# Patient Record
Sex: FEMALE
[De-identification: names, ages, dates, MRNs, and addresses within clinical notes are randomized per-mention and may not be internally consistent; named-entity substitution may affect disease eponyms.]

---

## 2019-11-20 ENCOUNTER — HOSPITAL ENCOUNTER (OUTPATIENT)
Dept: HOSPITAL 90 - EDH | Age: 84
Setting detail: OBSERVATION
LOS: 3 days | Discharge: HOME | End: 2019-11-23
Attending: INTERNAL MEDICINE | Admitting: INTERNAL MEDICINE
Payer: COMMERCIAL

## 2019-11-20 VITALS — BODY MASS INDEX: 37.67 KG/M2 | WEIGHT: 204.7 LBS | HEIGHT: 62 IN

## 2019-11-20 DIAGNOSIS — E66.01: ICD-10-CM

## 2019-11-20 DIAGNOSIS — I48.92: ICD-10-CM

## 2019-11-20 DIAGNOSIS — Z79.899: ICD-10-CM

## 2019-11-20 DIAGNOSIS — R31.9: ICD-10-CM

## 2019-11-20 DIAGNOSIS — E11.22: ICD-10-CM

## 2019-11-20 DIAGNOSIS — I13.0: ICD-10-CM

## 2019-11-20 DIAGNOSIS — Z90.49: ICD-10-CM

## 2019-11-20 DIAGNOSIS — D64.9: ICD-10-CM

## 2019-11-20 DIAGNOSIS — E78.00: ICD-10-CM

## 2019-11-20 DIAGNOSIS — N18.9: ICD-10-CM

## 2019-11-20 DIAGNOSIS — J98.11: ICD-10-CM

## 2019-11-20 DIAGNOSIS — I07.1: ICD-10-CM

## 2019-11-20 DIAGNOSIS — E78.5: ICD-10-CM

## 2019-11-20 DIAGNOSIS — R80.9: ICD-10-CM

## 2019-11-20 DIAGNOSIS — M19.90: ICD-10-CM

## 2019-11-20 DIAGNOSIS — I50.32: ICD-10-CM

## 2019-11-20 DIAGNOSIS — Z90.710: ICD-10-CM

## 2019-11-20 DIAGNOSIS — I48.91: ICD-10-CM

## 2019-11-20 DIAGNOSIS — J10.00: Primary | ICD-10-CM

## 2019-11-20 LAB
ALBUMIN SERPL-MCNC: 3.2 G/DL (ref 3.5–5)
ALT SERPL-CCNC: 20 U/L (ref 12–78)
APTT PPP: 26.8 SEC (ref 26.3–35.5)
AST SERPL-CCNC: 18 U/L (ref 10–37)
BASOPHILS NFR BLD AUTO: 0.6 % (ref 0–5)
BILIRUB SERPL-MCNC: 1.2 MG/DL (ref 0.2–1)
BNP SERPL-MCNC: 755 PG/ML (ref 0–100)
BUN SERPL-MCNC: 10 MG/DL (ref 7–18)
CHLORIDE SERPL-SCNC: 98 MMOL/L (ref 101–111)
CK SERPL-CCNC: 124 U/L (ref 21–232)
CO2 SERPL-SCNC: 26 MMOL/L (ref 21–32)
CREAT SERPL-MCNC: 1 MG/DL (ref 0.5–1.5)
EOSINOPHIL NFR BLD AUTO: 0.3 % (ref 0–8)
ERYTHROCYTE [DISTWIDTH] IN BLOOD BY AUTOMATED COUNT: 14.2 % (ref 11–15.5)
GFR SERPL CREATININE-BSD FRML MDRD: 55 ML/MIN (ref 60–?)
GLUCOSE SERPL-MCNC: 154 MG/DL (ref 70–105)
HCT VFR BLD AUTO: 40.9 % (ref 36–48)
INR PPP: 1.06 (ref 0.85–1.15)
KETONES UR STRIP-MCNC: 15 MG/DL
LYMPHOCYTES NFR SPEC AUTO: 10.1 % (ref 21–51)
MCH RBC QN AUTO: 32.7 PG (ref 27–33)
MCHC RBC AUTO-ENTMCNC: 34.7 G/DL (ref 32–36)
MCV RBC AUTO: 94.3 FL (ref 79–99)
MONOCYTES NFR BLD AUTO: 15.1 % (ref 3–13)
MYOGLOBIN SERPL-MCNC: 137 NG/ML (ref 10–92)
NEUTROPHILS NFR BLD AUTO: 73.9 % (ref 40–77)
NRBC BLD MANUAL-RTO: 0 % (ref 0–0.19)
PH UR STRIP: 5.5 [PH] (ref 5–8)
PLATELET # BLD AUTO: 184 K/UL (ref 130–400)
POTASSIUM SERPL-SCNC: 3.5 MMOL/L (ref 3.5–5.1)
PROT SERPL-MCNC: 7.7 G/DL (ref 6–8.3)
PROT UR QL STRIP: 300 MG/DL
PROTHROMBIN TIME: 10.9 SEC (ref 9.6–11.6)
RBC # BLD AUTO: 4.33 MIL/UL (ref 4–5.5)
RBC #/AREA URNS HPF: (no result) /HPF (ref 0–1)
SODIUM SERPL-SCNC: 135 MMOL/L (ref 136–145)
SP GR UR STRIP: 1.02 (ref 1–1.03)
TROPONIN I SERPL-MCNC: < 0.04 NG/ML (ref 0–0.06)
UROBILINOGEN UR STRIP-MCNC: 1 MG/DL (ref 0.2–1)
WBC # BLD AUTO: 11.4 K/UL (ref 4.8–10.8)
WBC #/AREA URNS HPF: (no result) /HPF (ref 0–1)

## 2019-11-20 PROCEDURE — 85730 THROMBOPLASTIN TIME PARTIAL: CPT

## 2019-11-20 PROCEDURE — 83605 ASSAY OF LACTIC ACID: CPT

## 2019-11-20 PROCEDURE — 93005 ELECTROCARDIOGRAM TRACING: CPT

## 2019-11-20 PROCEDURE — 71046 X-RAY EXAM CHEST 2 VIEWS: CPT

## 2019-11-20 PROCEDURE — 76770 US EXAM ABDO BACK WALL COMP: CPT

## 2019-11-20 PROCEDURE — 87804 INFLUENZA ASSAY W/OPTIC: CPT

## 2019-11-20 PROCEDURE — 96375 TX/PRO/DX INJ NEW DRUG ADDON: CPT

## 2019-11-20 PROCEDURE — 96365 THER/PROPH/DIAG IV INF INIT: CPT

## 2019-11-20 PROCEDURE — 85610 PROTHROMBIN TIME: CPT

## 2019-11-20 PROCEDURE — 81001 URINALYSIS AUTO W/SCOPE: CPT

## 2019-11-20 PROCEDURE — 87088 URINE BACTERIA CULTURE: CPT

## 2019-11-20 PROCEDURE — 85027 COMPLETE CBC AUTOMATED: CPT

## 2019-11-20 PROCEDURE — 94640 AIRWAY INHALATION TREATMENT: CPT

## 2019-11-20 PROCEDURE — 96376 TX/PRO/DX INJ SAME DRUG ADON: CPT

## 2019-11-20 PROCEDURE — 36415 COLL VENOUS BLD VENIPUNCTURE: CPT

## 2019-11-20 PROCEDURE — 82550 ASSAY OF CK (CPK): CPT

## 2019-11-20 PROCEDURE — 71045 X-RAY EXAM CHEST 1 VIEW: CPT

## 2019-11-20 PROCEDURE — 80053 COMPREHEN METABOLIC PANEL: CPT

## 2019-11-20 PROCEDURE — 83874 ASSAY OF MYOGLOBIN: CPT

## 2019-11-20 PROCEDURE — 84145 PROCALCITONIN (PCT): CPT

## 2019-11-20 PROCEDURE — 83880 ASSAY OF NATRIURETIC PEPTIDE: CPT

## 2019-11-20 PROCEDURE — 84484 ASSAY OF TROPONIN QUANT: CPT

## 2019-11-20 PROCEDURE — 80048 BASIC METABOLIC PNL TOTAL CA: CPT

## 2019-11-20 PROCEDURE — 85025 COMPLETE CBC W/AUTO DIFF WBC: CPT

## 2019-11-20 PROCEDURE — 99284 EMERGENCY DEPT VISIT MOD MDM: CPT

## 2019-11-20 PROCEDURE — 87040 BLOOD CULTURE FOR BACTERIA: CPT

## 2019-11-20 PROCEDURE — 96366 THER/PROPH/DIAG IV INF ADDON: CPT

## 2019-11-20 PROCEDURE — 94664 DEMO&/EVAL PT USE INHALER: CPT

## 2019-11-20 RX ADMIN — OSELTAMIVIR PHOSPHATE SCH MG: 75 CAPSULE ORAL at 23:45

## 2019-11-21 VITALS — DIASTOLIC BLOOD PRESSURE: 77 MMHG | SYSTOLIC BLOOD PRESSURE: 131 MMHG

## 2019-11-21 VITALS — SYSTOLIC BLOOD PRESSURE: 147 MMHG | DIASTOLIC BLOOD PRESSURE: 103 MMHG

## 2019-11-21 VITALS — DIASTOLIC BLOOD PRESSURE: 83 MMHG | SYSTOLIC BLOOD PRESSURE: 147 MMHG

## 2019-11-21 VITALS — SYSTOLIC BLOOD PRESSURE: 167 MMHG | DIASTOLIC BLOOD PRESSURE: 81 MMHG

## 2019-11-21 VITALS — DIASTOLIC BLOOD PRESSURE: 80 MMHG | SYSTOLIC BLOOD PRESSURE: 139 MMHG

## 2019-11-21 VITALS — DIASTOLIC BLOOD PRESSURE: 77 MMHG | SYSTOLIC BLOOD PRESSURE: 127 MMHG

## 2019-11-21 VITALS — DIASTOLIC BLOOD PRESSURE: 74 MMHG | SYSTOLIC BLOOD PRESSURE: 146 MMHG

## 2019-11-21 LAB
BUN SERPL-MCNC: 11 MG/DL (ref 7–18)
CHLORIDE SERPL-SCNC: 100 MMOL/L (ref 101–111)
CO2 SERPL-SCNC: 24 MMOL/L (ref 21–32)
CREAT SERPL-MCNC: 1 MG/DL (ref 0.5–1.5)
ERYTHROCYTE [DISTWIDTH] IN BLOOD BY AUTOMATED COUNT: 13.9 % (ref 11–15.5)
GFR SERPL CREATININE-BSD FRML MDRD: 55 ML/MIN (ref 60–?)
GLUCOSE SERPL-MCNC: 189 MG/DL (ref 70–105)
HCT VFR BLD AUTO: 38.5 % (ref 36–48)
MCH RBC QN AUTO: 32.6 PG (ref 27–33)
MCHC RBC AUTO-ENTMCNC: 34.7 G/DL (ref 32–36)
MCV RBC AUTO: 94 FL (ref 79–99)
NRBC BLD MANUAL-RTO: 0 % (ref 0–0.19)
PLATELET # BLD AUTO: 186 K/UL (ref 130–400)
POTASSIUM SERPL-SCNC: 4 MMOL/L (ref 3.5–5.1)
RBC # BLD AUTO: 4.1 MIL/UL (ref 4–5.5)
SODIUM SERPL-SCNC: 135 MMOL/L (ref 136–145)
WBC # BLD AUTO: 10.9 K/UL (ref 4.8–10.8)

## 2019-11-21 RX ADMIN — AZITHROMYCIN MONOHYDRATE SCH MLS/HR: 500 INJECTION, POWDER, LYOPHILIZED, FOR SOLUTION INTRAVENOUS at 00:28

## 2019-11-21 RX ADMIN — WATER SCH ML: 1 INJECTION INTRAMUSCULAR; INTRAVENOUS; SUBCUTANEOUS at 00:00

## 2019-11-21 RX ADMIN — OSELTAMIVIR PHOSPHATE SCH MG: 75 CAPSULE ORAL at 09:38

## 2019-11-21 RX ADMIN — SODIUM CHLORIDE SCH GM: 9 INJECTION, SOLUTION INTRAVENOUS at 00:00

## 2019-11-21 RX ADMIN — IPRATROPIUM BROMIDE AND ALBUTEROL SULFATE PRN UDVIAL: .5; 3 SOLUTION RESPIRATORY (INHALATION) at 06:46

## 2019-11-21 RX ADMIN — IPRATROPIUM BROMIDE AND ALBUTEROL SULFATE PRN UDVIAL: .5; 3 SOLUTION RESPIRATORY (INHALATION) at 01:17

## 2019-11-21 RX ADMIN — OSELTAMIVIR PHOSPHATE SCH MG: 75 CAPSULE ORAL at 20:29

## 2019-11-21 NOTE — NUR
DCP

CM met with pt discussed dc plans. Pt is semi-independent prior to admission, lives at home 
with daughter and son in law. Pt has a wheelchair, walker, shower chair. Denies any other 
equipments/services. Feels safe to go back home, daughter and son in law able to assist with 
transportation and needs as necessary. Offered possible short term placement rehab, pt 
undecided for now, pt and family would like to wait for MD recommendations prior to 
deciding. DC plan to home vs SNF. CM to cont to follow up.

-------------------------------------------------------------------------------

Addendum: 11/21/19 at 1452 by KALYAN VIGIL LVN CM

-------------------------------------------------------------------------------

Amended: Links added.

## 2019-11-21 NOTE — NUR
blind in right eye

-------------------------------------------------------------------------------

Addendum: 11/21/19 at 1201 by MAURICIO VARELA RN RN

-------------------------------------------------------------------------------

Amended: Links added.

## 2019-11-21 NOTE — NUR
CM Note: declined placement

CM met with pt and daughter. Discussed MD recommendations for possible SNF vs home w/HH. Pt 
verbalized she wants to go home, daughter state will follow up w/pcp for home w/HH for 
possible PT at home instead. Declined placement at this time. Primary nurse aware. CM to 
cont to follow up.

## 2019-11-21 NOTE — NUR
CHIEF COMPLAINT COUGH AND COLDS

PATIENT COMPLAINED OF CONTINUOUS COUGHING WITH SECRETIONS, ACCORDING TO PATIENT IT IS HARD 
FOR HER TO EXPECTORATE PHLEGM. DAUGHTER IS ASKING MEDICATIONS TO LOOSEN PHLEGM. SPOKE WITH 
RT FOR NEB TREATMENT. PAGED ON CALL FOR ELANA BOBBY REGARDING PATIENT'S COMPLAINT 
PENDING FOR CALL BACK

## 2019-11-22 VITALS — SYSTOLIC BLOOD PRESSURE: 151 MMHG | DIASTOLIC BLOOD PRESSURE: 76 MMHG

## 2019-11-22 VITALS — DIASTOLIC BLOOD PRESSURE: 91 MMHG | SYSTOLIC BLOOD PRESSURE: 143 MMHG

## 2019-11-22 VITALS — DIASTOLIC BLOOD PRESSURE: 76 MMHG | SYSTOLIC BLOOD PRESSURE: 144 MMHG

## 2019-11-22 VITALS — SYSTOLIC BLOOD PRESSURE: 138 MMHG | DIASTOLIC BLOOD PRESSURE: 74 MMHG

## 2019-11-22 VITALS — SYSTOLIC BLOOD PRESSURE: 133 MMHG | DIASTOLIC BLOOD PRESSURE: 68 MMHG

## 2019-11-22 VITALS — SYSTOLIC BLOOD PRESSURE: 146 MMHG | DIASTOLIC BLOOD PRESSURE: 77 MMHG

## 2019-11-22 LAB
BASOPHILS NFR BLD AUTO: 0.1 % (ref 0–5)
BUN SERPL-MCNC: 13 MG/DL (ref 7–18)
BUN SERPL-MCNC: 15 MG/DL (ref 7–18)
CHLORIDE SERPL-SCNC: 103 MMOL/L (ref 101–111)
CHLORIDE SERPL-SCNC: 103 MMOL/L (ref 101–111)
CO2 SERPL-SCNC: 27 MMOL/L (ref 21–32)
CO2 SERPL-SCNC: 28 MMOL/L (ref 21–32)
CREAT SERPL-MCNC: 1 MG/DL (ref 0.5–1.5)
CREAT SERPL-MCNC: 1.1 MG/DL (ref 0.5–1.5)
EOSINOPHIL NFR BLD AUTO: 0 % (ref 0–8)
ERYTHROCYTE [DISTWIDTH] IN BLOOD BY AUTOMATED COUNT: 13.7 % (ref 11–15.5)
GFR SERPL CREATININE-BSD FRML MDRD: 50 ML/MIN (ref 60–?)
GFR SERPL CREATININE-BSD FRML MDRD: 55 ML/MIN (ref 60–?)
GLUCOSE SERPL-MCNC: 144 MG/DL (ref 70–105)
GLUCOSE SERPL-MCNC: 210 MG/DL (ref 70–105)
HCT VFR BLD AUTO: 39 % (ref 36–48)
LYMPHOCYTES NFR SPEC AUTO: 9.5 % (ref 21–51)
MCH RBC QN AUTO: 32.8 PG (ref 27–33)
MCHC RBC AUTO-ENTMCNC: 34.5 G/DL (ref 32–36)
MCV RBC AUTO: 95.2 FL (ref 79–99)
MONOCYTES NFR BLD AUTO: 11.9 % (ref 3–13)
NEUTROPHILS NFR BLD AUTO: 78.5 % (ref 40–77)
NRBC BLD MANUAL-RTO: 0 % (ref 0–0.19)
PLATELET # BLD AUTO: 208 K/UL (ref 130–400)
POTASSIUM SERPL-SCNC: 3.4 MMOL/L (ref 3.5–5.1)
POTASSIUM SERPL-SCNC: 3.8 MMOL/L (ref 3.5–5.1)
RBC # BLD AUTO: 4.1 MIL/UL (ref 4–5.5)
SODIUM SERPL-SCNC: 139 MMOL/L (ref 136–145)
SODIUM SERPL-SCNC: 140 MMOL/L (ref 136–145)
WBC # BLD AUTO: 13.9 K/UL (ref 4.8–10.8)

## 2019-11-22 RX ADMIN — OSELTAMIVIR PHOSPHATE SCH MG: 75 CAPSULE ORAL at 20:11

## 2019-11-22 RX ADMIN — IPRATROPIUM BROMIDE AND ALBUTEROL SULFATE PRN UDVIAL: .5; 3 SOLUTION RESPIRATORY (INHALATION) at 18:14

## 2019-11-22 RX ADMIN — AZITHROMYCIN MONOHYDRATE SCH MLS/HR: 500 INJECTION, POWDER, LYOPHILIZED, FOR SOLUTION INTRAVENOUS at 23:08

## 2019-11-22 RX ADMIN — WATER SCH ML: 1 INJECTION INTRAMUSCULAR; INTRAVENOUS; SUBCUTANEOUS at 23:08

## 2019-11-22 RX ADMIN — OSELTAMIVIR PHOSPHATE SCH MG: 75 CAPSULE ORAL at 09:22

## 2019-11-22 RX ADMIN — AZITHROMYCIN MONOHYDRATE SCH MLS/HR: 500 INJECTION, POWDER, LYOPHILIZED, FOR SOLUTION INTRAVENOUS at 03:26

## 2019-11-22 RX ADMIN — SODIUM CHLORIDE SCH GM: 9 INJECTION, SOLUTION INTRAVENOUS at 03:26

## 2019-11-22 RX ADMIN — WATER SCH ML: 1 INJECTION INTRAMUSCULAR; INTRAVENOUS; SUBCUTANEOUS at 00:00

## 2019-11-22 RX ADMIN — IPRATROPIUM BROMIDE AND ALBUTEROL SULFATE PRN UDVIAL: .5; 3 SOLUTION RESPIRATORY (INHALATION) at 06:35

## 2019-11-22 RX ADMIN — SODIUM CHLORIDE SCH GM: 9 INJECTION, SOLUTION INTRAVENOUS at 23:08

## 2019-11-23 VITALS — SYSTOLIC BLOOD PRESSURE: 137 MMHG | DIASTOLIC BLOOD PRESSURE: 82 MMHG

## 2019-11-23 VITALS — SYSTOLIC BLOOD PRESSURE: 148 MMHG | DIASTOLIC BLOOD PRESSURE: 86 MMHG

## 2019-11-23 VITALS — DIASTOLIC BLOOD PRESSURE: 61 MMHG | SYSTOLIC BLOOD PRESSURE: 113 MMHG

## 2019-11-23 LAB
ERYTHROCYTE [DISTWIDTH] IN BLOOD BY AUTOMATED COUNT: 14.7 % (ref 11–15.5)
HCT VFR BLD AUTO: 41.3 % (ref 36–48)
MCH RBC QN AUTO: 32.2 PG (ref 27–33)
MCHC RBC AUTO-ENTMCNC: 33.6 G/DL (ref 32–36)
MCV RBC AUTO: 95.8 FL (ref 79–99)
NRBC BLD MANUAL-RTO: 0 % (ref 0–0.19)
PLATELET # BLD AUTO: 206 K/UL (ref 130–400)
RBC # BLD AUTO: 4.32 MIL/UL (ref 4–5.5)
WBC # BLD AUTO: 10.6 K/UL (ref 4.8–10.8)

## 2019-11-23 RX ADMIN — OSELTAMIVIR PHOSPHATE SCH MG: 75 CAPSULE ORAL at 09:25

## 2019-11-23 RX ADMIN — POTASSIUM CHLORIDE PRN MEQ: 1500 TABLET, EXTENDED RELEASE ORAL at 12:57

## 2019-11-23 RX ADMIN — POTASSIUM CHLORIDE PRN MEQ: 1500 TABLET, EXTENDED RELEASE ORAL at 06:53

## 2019-11-23 RX ADMIN — IPRATROPIUM BROMIDE AND ALBUTEROL SULFATE PRN UDVIAL: .5; 3 SOLUTION RESPIRATORY (INHALATION) at 06:29

## 2019-11-23 NOTE — NUR
DISCHARGE INSTRUCTIONS GIVEN TO PATIENT AND PATIENTS DAUGHTER. MADE AWARE OF NEW RX FOR 
ZPACK AND HOW TO CONTINUE TAKING TAMIFLU. BOTH VOICED UNDERSTANDING. IV TO LEFT HAND REMOVED 
WITH NO COMPLICATIONS. AT THIS TIME PATIENT AAOX3, DENIES ANY PAIN OR SHORTNESS OF BREATH NO 
SIGNS AND SYMPTOMS OF ACUTE DISTRESS NOTED. TO BE TRANSPORTED HOME BY DAUGHTER